# Patient Record
Sex: FEMALE | Race: OTHER | NOT HISPANIC OR LATINO | URBAN - METROPOLITAN AREA
[De-identification: names, ages, dates, MRNs, and addresses within clinical notes are randomized per-mention and may not be internally consistent; named-entity substitution may affect disease eponyms.]

---

## 2019-10-27 ENCOUNTER — EMERGENCY (EMERGENCY)
Facility: HOSPITAL | Age: 54
LOS: 1 days | Discharge: HOME | End: 2019-10-27
Attending: EMERGENCY MEDICINE
Payer: MEDICAID

## 2019-10-27 VITALS
OXYGEN SATURATION: 100 % | TEMPERATURE: 98 F | HEIGHT: 64 IN | WEIGHT: 149.91 LBS | DIASTOLIC BLOOD PRESSURE: 87 MMHG | SYSTOLIC BLOOD PRESSURE: 157 MMHG | RESPIRATION RATE: 16 BRPM | HEART RATE: 82 BPM

## 2019-10-27 VITALS
DIASTOLIC BLOOD PRESSURE: 86 MMHG | RESPIRATION RATE: 16 BRPM | OXYGEN SATURATION: 98 % | SYSTOLIC BLOOD PRESSURE: 142 MMHG | HEART RATE: 78 BPM

## 2019-10-27 LAB
ALBUMIN SERPL ELPH-MCNC: 4.6 G/DL — SIGNIFICANT CHANGE UP (ref 3.3–5)
ALP SERPL-CCNC: 92 U/L — SIGNIFICANT CHANGE UP (ref 40–120)
ALT FLD-CCNC: 29 U/L — SIGNIFICANT CHANGE UP (ref 10–45)
ANION GAP SERPL CALC-SCNC: 19 MMOL/L — HIGH (ref 5–17)
APPEARANCE UR: CLEAR — SIGNIFICANT CHANGE UP
AST SERPL-CCNC: 17 U/L — SIGNIFICANT CHANGE UP (ref 10–40)
BACTERIA # UR AUTO: NEGATIVE — SIGNIFICANT CHANGE UP
BASOPHILS # BLD AUTO: 0.04 K/UL — SIGNIFICANT CHANGE UP (ref 0–0.2)
BASOPHILS NFR BLD AUTO: 0.5 % — SIGNIFICANT CHANGE UP (ref 0–2)
BILIRUB SERPL-MCNC: 0.2 MG/DL — SIGNIFICANT CHANGE UP (ref 0.2–1.2)
BILIRUB UR-MCNC: NEGATIVE — SIGNIFICANT CHANGE UP
BUN SERPL-MCNC: 16 MG/DL — SIGNIFICANT CHANGE UP (ref 7–23)
CALCIUM SERPL-MCNC: 9.4 MG/DL — SIGNIFICANT CHANGE UP (ref 8.4–10.5)
CHLORIDE SERPL-SCNC: 101 MMOL/L — SIGNIFICANT CHANGE UP (ref 96–108)
CO2 SERPL-SCNC: 25 MMOL/L — SIGNIFICANT CHANGE UP (ref 22–31)
COLOR SPEC: SIGNIFICANT CHANGE UP
CREAT SERPL-MCNC: 0.64 MG/DL — SIGNIFICANT CHANGE UP (ref 0.5–1.3)
DIFF PNL FLD: ABNORMAL
EOSINOPHIL # BLD AUTO: 0.25 K/UL — SIGNIFICANT CHANGE UP (ref 0–0.5)
EOSINOPHIL NFR BLD AUTO: 3.4 % — SIGNIFICANT CHANGE UP (ref 0–6)
EPI CELLS # UR: 1 /HPF — SIGNIFICANT CHANGE UP
GLUCOSE SERPL-MCNC: 91 MG/DL — SIGNIFICANT CHANGE UP (ref 70–99)
GLUCOSE UR QL: NEGATIVE — SIGNIFICANT CHANGE UP
HCG UR QL: NEGATIVE — SIGNIFICANT CHANGE UP
HCT VFR BLD CALC: 45.8 % — HIGH (ref 34.5–45)
HGB BLD-MCNC: 14.8 G/DL — SIGNIFICANT CHANGE UP (ref 11.5–15.5)
HYALINE CASTS # UR AUTO: 0 /LPF — SIGNIFICANT CHANGE UP (ref 0–2)
IMM GRANULOCYTES NFR BLD AUTO: 0.3 % — SIGNIFICANT CHANGE UP (ref 0–1.5)
KETONES UR-MCNC: NEGATIVE — SIGNIFICANT CHANGE UP
LEUKOCYTE ESTERASE UR-ACNC: NEGATIVE — SIGNIFICANT CHANGE UP
LIDOCAIN IGE QN: 54 U/L — SIGNIFICANT CHANGE UP (ref 7–60)
LYMPHOCYTES # BLD AUTO: 2.79 K/UL — SIGNIFICANT CHANGE UP (ref 1–3.3)
LYMPHOCYTES # BLD AUTO: 38.1 % — SIGNIFICANT CHANGE UP (ref 13–44)
MCHC RBC-ENTMCNC: 28.5 PG — SIGNIFICANT CHANGE UP (ref 27–34)
MCHC RBC-ENTMCNC: 32.3 GM/DL — SIGNIFICANT CHANGE UP (ref 32–36)
MCV RBC AUTO: 88.1 FL — SIGNIFICANT CHANGE UP (ref 80–100)
MONOCYTES # BLD AUTO: 0.62 K/UL — SIGNIFICANT CHANGE UP (ref 0–0.9)
MONOCYTES NFR BLD AUTO: 8.5 % — SIGNIFICANT CHANGE UP (ref 2–14)
NEUTROPHILS # BLD AUTO: 3.6 K/UL — SIGNIFICANT CHANGE UP (ref 1.8–7.4)
NEUTROPHILS NFR BLD AUTO: 49.2 % — SIGNIFICANT CHANGE UP (ref 43–77)
NITRITE UR-MCNC: NEGATIVE — SIGNIFICANT CHANGE UP
NRBC # BLD: 0 /100 WBCS — SIGNIFICANT CHANGE UP (ref 0–0)
PH UR: 5.5 — SIGNIFICANT CHANGE UP (ref 5–8)
PLATELET # BLD AUTO: 263 K/UL — SIGNIFICANT CHANGE UP (ref 150–400)
POTASSIUM SERPL-MCNC: 3.9 MMOL/L — SIGNIFICANT CHANGE UP (ref 3.5–5.3)
POTASSIUM SERPL-SCNC: 3.9 MMOL/L — SIGNIFICANT CHANGE UP (ref 3.5–5.3)
PROT SERPL-MCNC: 7.8 G/DL — SIGNIFICANT CHANGE UP (ref 6–8.3)
PROT UR-MCNC: NEGATIVE — SIGNIFICANT CHANGE UP
RBC # BLD: 5.2 M/UL — SIGNIFICANT CHANGE UP (ref 3.8–5.2)
RBC # FLD: 13.3 % — SIGNIFICANT CHANGE UP (ref 10.3–14.5)
RBC CASTS # UR COMP ASSIST: 1 /HPF — SIGNIFICANT CHANGE UP (ref 0–4)
SODIUM SERPL-SCNC: 145 MMOL/L — SIGNIFICANT CHANGE UP (ref 135–145)
SP GR SPEC: 1.01 — SIGNIFICANT CHANGE UP (ref 1.01–1.02)
UROBILINOGEN FLD QL: NEGATIVE — SIGNIFICANT CHANGE UP
WBC # BLD: 7.32 K/UL — SIGNIFICANT CHANGE UP (ref 3.8–10.5)
WBC # FLD AUTO: 7.32 K/UL — SIGNIFICANT CHANGE UP (ref 3.8–10.5)
WBC UR QL: 0 /HPF — SIGNIFICANT CHANGE UP (ref 0–5)

## 2019-10-27 RX ORDER — ACETAMINOPHEN 500 MG
975 TABLET ORAL ONCE
Refills: 0 | Status: COMPLETED | OUTPATIENT
Start: 2019-10-27 | End: 2019-10-27

## 2019-10-27 RX ORDER — LIDOCAINE 4 G/100G
1 CREAM TOPICAL ONCE
Refills: 0 | Status: COMPLETED | OUTPATIENT
Start: 2019-10-27 | End: 2019-10-27

## 2019-10-27 RX ORDER — KETOROLAC TROMETHAMINE 30 MG/ML
15 SYRINGE (ML) INJECTION ONCE
Refills: 0 | Status: DISCONTINUED | OUTPATIENT
Start: 2019-10-27 | End: 2019-10-27

## 2019-10-27 RX ORDER — SODIUM CHLORIDE 9 MG/ML
1000 INJECTION INTRAMUSCULAR; INTRAVENOUS; SUBCUTANEOUS ONCE
Refills: 0 | Status: COMPLETED | OUTPATIENT
Start: 2019-10-27 | End: 2019-10-27

## 2019-10-27 RX ADMIN — Medication 15 MILLIGRAM(S): at 04:32

## 2019-10-27 RX ADMIN — Medication 975 MILLIGRAM(S): at 04:32

## 2019-10-27 RX ADMIN — LIDOCAINE 1 PATCH: 4 CREAM TOPICAL at 05:48

## 2019-10-27 RX ADMIN — SODIUM CHLORIDE 1000 MILLILITER(S): 9 INJECTION INTRAMUSCULAR; INTRAVENOUS; SUBCUTANEOUS at 04:32

## 2019-10-27 NOTE — ED PROVIDER NOTE - CLINICAL SUMMARY MEDICAL DECISION MAKING FREE TEXT BOX
52yo woman PMH hypothyroidism and asthma presents with right flank pain with urinary symptoms or GI symptoms. Will obtain CT to evaluate for kidney stone as no history of kidney stone, possible appendicitis with RLQ pain but minimal tenderness on exam, will check cbc, cmp, lipase, check UA. will give pain medication and reassess.

## 2019-10-27 NOTE — ED PROVIDER NOTE - NSFOLLOWUPINSTRUCTIONS_ED_ALL_ED_FT
Please follow up with your primary care provider in the next few days.    Take 650mg tylenol every 6 hours as needed for pain.  Take 600mg ibuprofen every 6 hours as needed for pain, make sure to take with food.  Use a lidocaine patch (salonpas) on the area for up to 12 hours at a time as needed.     Use ice to the area for 10 minutes every 2 hours for the first 2 days.     Continue all home medications as prescribed.    Seek care immediately if:   Your bowel movement has blood in it, or looks like black tar.   You cannot stop vomiting, or vomit blood.  Your abdomen is larger than usual, very painful, and hard.   You have severe pain in your abdomen.   You stop passing gas or having bowel movements.   You have heavy vaginal bleeding.  You have chest pain or shortness of breath.  You feel weak, dizzy, or faint.  Or any worse or abnormal symptoms.     Please read all attached.

## 2019-10-27 NOTE — ED ADULT NURSE NOTE - OBJECTIVE STATEMENT
54 yo female from home A&OX4 c/o lower right quadrant abd pn that radiates to right flank and back. Pt st symptoms began Thursday, denies fever. Pt denies hx kidney stones. abd soft non tender. PT denies n/v/d/dizziness. Pt denies chx pn/SOB, ls equal clr bilat apices to bases. pt denies urinary symptoms. VS stable. IV access obtained in right AC via 18G catheter. labs drawn and sent, urine sample obtained and sent. US and CT pending. Pt medicated for pn, will continue to monitor.

## 2019-10-27 NOTE — ED PROVIDER NOTE - PROGRESS NOTE DETAILS
ct nonactionable. pt re-evaluated, pain sig improved. pt asknig to be d/c'd. return precautions, importance of f/u with pmd d/w patient. an opportunity to ask questions was provided and all answered. - Tyrone Abad MD

## 2019-10-27 NOTE — ED ADULT NURSE NOTE - NSIMPLEMENTINTERV_GEN_ALL_ED
Implemented All Universal Safety Interventions:  [place] to call system. Call bell, personal items and telephone within reach. Instruct patient to call for assistance. Room bathroom lighting operational. Non-slip footwear when patient is off stretcher. Physically safe environment: no spills, clutter or unnecessary equipment. Stretcher in lowest position, wheels locked, appropriate side rails in place.

## 2019-10-27 NOTE — ED PROVIDER NOTE - PHYSICAL EXAMINATION
General: well-appearing woman in no acute distress  Head: normocephalic, atraumatic  Eyes: clear eyes, no conjunctival injection  Mouth: moist mucous membranes  Neck: supple neck  CV: normal rate and rhythm, no LE edema or tenderness to palpation, peripheral pulses 2+ bilaterally  Respiratory: clear to auscultation bilaterally  Abdomen: soft, nontender, nondistended  : no suprapubic tenderness, no CVAT  MSK: no Cspine tenderness to palpation, no midline tenderness to palpation  Neuro: alert and oriented x3, speech clear, moving all extremities spontaneously  Skin: no rash on chest  Extremities: no tenderness to palpation of joints General: uncomfortable-appearing woman in mild distress due to pain  Head: normocephalic, atraumatic  Eyes: clear eyes, no conjunctival injection  Mouth: moist mucous membranes, well healed scar on chin  Neck: supple neck  CV: normal rate and rhythm, no LE edema or tenderness to palpation, peripheral pulses 2+ bilaterally  Respiratory: clear to auscultation bilaterally  Abdomen: soft, nondistended, minimally tender to palpation of RLQ, tender to palpation of right flank that worsens with movement  : no suprapubic tenderness, no CVAT  MSK: no Cspine tenderness to palpation, no midline tenderness to palpation  Neuro: alert and oriented x3, speech clear, moving all extremities spontaneously  Skin: no rash on back or abdomen  Extremities: no tenderness to palpation of joints

## 2019-10-27 NOTE — ED PROVIDER NOTE - PATIENT PORTAL LINK FT
You can access the FollowMyHealth Patient Portal offered by [facility] by registering at the following website: [email or web address]. By joining [facility]’s FollowMyHealth portal, you will also be able to view your health information using other applications (apps) compatible with our system.

## 2019-10-27 NOTE — ED PROVIDER NOTE - NS ED ROS FT
General: no fevers  Head: no headache or lightheadedness  Eyes: no vision change  ENT: no nasal discharge/congestion  CV: no chest pain  Resp: no SOB, no cough  GI: no N/V/D, no blood in stool  : no dysuria, no hematuria, no vaginal discharge  MSK: +right flank pain  Skin: no rash  Neuro: no focal weakness, no change in sensation

## 2019-10-27 NOTE — ED PROVIDER NOTE - ATTENDING CONTRIBUTION TO CARE
53F, pmh hypothyroidism, asthma, presents with R flank pain x 1 day. Pain R flank, radiates to RLQ, began after sneezing and hanging laundry. Denies any other pain or injuries. Pt placed lidocaine patch to area without sig improvement. Pt denies cp, sob. Denies f/c, n/v/d, dysuria, hematuria. Denies vag bleeding or d/c.     PE: NAD, NCAT, MMM, Trachea midline, Normal conjunctiva, lungs CTAB, S1/S2 RRR, Normal perfusion, 2+ radial pulses bilat, Abdomen Soft, Non-distended, minimally TTP RLQ, No rebound/guarding, No cva ttp, No LE edema, No deformity of extremities, No rashes,  No focal motor or sensory deficits.     Pt well appearing. VS wihtout sig abnormality. Ddx includes but not limtied to kidney stone, pyelonephritis, muscle strain, low suspicion acute gi intra-abd pathology. Obtain labs, ct, urine, give analgesia and re-eval. - Tyrone Abad MD

## 2019-10-27 NOTE — ED PROVIDER NOTE - OBJECTIVE STATEMENT
54yo woman PMH hypothyroidism, asthma, and allergies presents with right lower quadrant pain x 2 days. 54yo woman PMH hypothyroidism, asthma, and allergies presents with right flank pain x 1 day in setting of right lower quadrant pain x 2 days after sneezing. The flank pain started while she was hanging laundry but no other trauma. She states she placed a lidocaine patch on right lower quadrant and had improvement of pain and then had right flank pain for which she tried the lidocaine patch with no relief. She did not take any other medication. She denies associated fever, dysuria, blood in urine, n/v. No new rash. She has a history of having microscopic blood on routine urine tests. No gynecologic history except +PAP smear in past, no ovarian cysts or fibroids. she is currently perimenopausal.

## 2019-10-28 LAB
CULTURE RESULTS: SIGNIFICANT CHANGE UP
SPECIMEN SOURCE: SIGNIFICANT CHANGE UP